# Patient Record
Sex: FEMALE | Race: AMERICAN INDIAN OR ALASKA NATIVE
[De-identification: names, ages, dates, MRNs, and addresses within clinical notes are randomized per-mention and may not be internally consistent; named-entity substitution may affect disease eponyms.]

---

## 2018-03-19 ENCOUNTER — HOSPITAL ENCOUNTER (EMERGENCY)
Dept: HOSPITAL 5 - ED | Age: 63
Discharge: HOME | End: 2018-03-19
Payer: COMMERCIAL

## 2018-03-19 VITALS — SYSTOLIC BLOOD PRESSURE: 112 MMHG | DIASTOLIC BLOOD PRESSURE: 63 MMHG

## 2018-03-19 DIAGNOSIS — E11.65: Primary | ICD-10-CM

## 2018-03-19 LAB
BASOPHILS # (AUTO): 0 K/MM3 (ref 0–0.1)
BASOPHILS NFR BLD AUTO: 0.8 % (ref 0–1.8)
BUN SERPL-MCNC: 9 MG/DL (ref 7–17)
BUN/CREAT SERPL: 13 %
CALCIUM SERPL-MCNC: 9.5 MG/DL (ref 8.4–10.2)
EOSINOPHIL # BLD AUTO: 0 K/MM3 (ref 0–0.4)
EOSINOPHIL NFR BLD AUTO: 0.8 % (ref 0–4.3)
HCT VFR BLD CALC: 41.2 % (ref 30.3–42.9)
HEMOLYSIS INDEX: 6
HGB BLD-MCNC: 13.5 GM/DL (ref 10.1–14.3)
LYMPHOCYTES # BLD AUTO: 2.1 K/MM3 (ref 1.2–5.4)
LYMPHOCYTES NFR BLD AUTO: 35.7 % (ref 13.4–35)
MCH RBC QN AUTO: 30 PG (ref 28–32)
MCHC RBC AUTO-ENTMCNC: 33 % (ref 30–34)
MCV RBC AUTO: 92 FL (ref 79–97)
MONOCYTES # (AUTO): 0.4 K/MM3 (ref 0–0.8)
MONOCYTES % (AUTO): 6 % (ref 0–7.3)
PLATELET # BLD: 361 K/MM3 (ref 140–440)
RBC # BLD AUTO: 4.48 M/MM3 (ref 3.65–5.03)

## 2018-03-19 PROCEDURE — 80048 BASIC METABOLIC PNL TOTAL CA: CPT

## 2018-03-19 PROCEDURE — 82962 GLUCOSE BLOOD TEST: CPT

## 2018-03-19 PROCEDURE — 36415 COLL VENOUS BLD VENIPUNCTURE: CPT

## 2018-03-19 PROCEDURE — 82805 BLOOD GASES W/O2 SATURATION: CPT

## 2018-03-19 PROCEDURE — 99284 EMERGENCY DEPT VISIT MOD MDM: CPT

## 2018-03-19 PROCEDURE — 85025 COMPLETE CBC W/AUTO DIFF WBC: CPT

## 2018-03-19 NOTE — EMERGENCY DEPARTMENT REPORT
ED General Adult HPI





- General


Chief complaint: Hyperglycemia


Stated complaint: HIGH BLOOD GLUCOSE


Time Seen by Provider: 03/19/18 11:01


Source: patient, family, RN notes reviewed


Mode of arrival: Ambulatory


Limitations: No Limitations





- History of Present Illness


Initial comments: 





This is a 62-year-old female.  The patient is previously unknown to this 

provider.  She has a past medical history of diabetes, pacemaker/defibrillator, 

peripheral venous disease.  Patient was at her outpatient vascular surgeon, 

getting an evaluation for an intervention, and she was found incidentally have 

asymptomatic hypotension.  Patient reports that she takes metformin, 1000 mg 

twice daily, Lantus, 24 units at night, and 6 units of Humalog.


She reports compliance with her medications but admits to dietary indiscretions.








She has no complaints.  She denies headache, neck pain, chest pain, abdominal 

pain, shortness of breath, urinary symptoms.





Improves with: none


Worsens with: none


Associated Symptoms: denies other symptoms





- Related Data


 Allergies











Allergy/AdvReac Type Severity Reaction Status Date / Time


 


No Known Allergies Allergy   Verified 03/19/18 11:07














ED Review of Systems


ROS: 


Stated complaint: HIGH BLOOD GLUCOSE


Other details as noted in HPI





Comment: All other systems reviewed and negative





ED Past Medical Hx





- Past Medical History


Hx Diabetes: Yes


Additional medical history: PACEMAKER/DEFIBRILLATOR





- Surgical History


Past Surgical History?: No


Additional Surgical History: STENT PLACED IN LEFT LEG





- Social History


Smoking Status: Never Smoker


Substance Use Type: None





ED Physical Exam





- General


Limitations: No Limitations


General appearance: alert, in no apparent distress





- Head


Head exam: Present: atraumatic, normocephalic





- Eye


Eye exam: Present: normal appearance, PERRL, EOMI.  Absent: nystagmus





- ENT


ENT exam: Present: normal exam, normal orophraynx, mucous membranes moist, 

normal external ear exam





- Neck


Neck exam: Present: normal inspection, full ROM





- Respiratory


Respiratory exam: Present: normal lung sounds bilaterally.  Absent: respiratory 

distress





- Cardiovascular


Cardiovascular Exam: Present: regular rate, normal rhythm, normal heart sounds.

  Absent: systolic murmur, diastolic murmur, rubs, gallop





- GI/Abdominal


GI/Abdominal exam: Present: soft, normal bowel sounds.  Absent: distended, 

tenderness, guarding, rebound, rigid, pulsatile mass





- Extremities Exam


Extremities exam: Present: normal inspection, full ROM, normal capillary 

refill.  Absent: pedal edema, joint swelling, calf tenderness





- Back Exam


Back exam: Present: normal inspection, full ROM.  Absent: tenderness, CVA 

tenderness (R), paraspinal tenderness, vertebral tenderness





- Neurological Exam


Neurological exam: Present: alert, oriented X3, CN II-XII intact, normal gait, 

other (Extraocular movements intact.  Tongue midline.  No facial droop.  Facial 

sensation intact to light touch in the V1, V2, V3 distribution bilaterally.  5 

and 5 strength in 4 extremities..  Sensation is intact to light touch in 4 

extremities.).  Absent: motor sensory deficit





- Psychiatric


Psychiatric exam: Present: normal affect, normal mood





- Skin


Skin exam: Present: warm, dry, intact, normal color.  Absent: rash





ED Course





 Vital Signs











  03/19/18





  10:20


 


Temperature 98.1 F


 


Pulse Rate 94 H


 


Respiratory 18





Rate 


 


Blood Pressure 121/68


 


O2 Sat by Pulse 96





Oximetry 














ED Medical Decision Making





- Lab Data


Result diagrams: 


 03/19/18 10:32





 03/19/18 10:32








 Vital Signs











  03/19/18





  10:20


 


Temperature 98.1 F


 


Pulse Rate 94 H


 


Respiratory 18





Rate 


 


Blood Pressure 121/68


 


O2 Sat by Pulse 96





Oximetry 











 Lab Results











  03/19/18 03/19/18 03/19/18 Range/Units





  10:32 10:32 10:32 


 


WBC  6.0    (4.5-11.0)  K/mm3


 


RBC  4.48    (3.65-5.03)  M/mm3


 


Hgb  13.5    (10.1-14.3)  gm/dl


 


Hct  41.2    (30.3-42.9)  %


 


MCV  92    (79-97)  fl


 


MCH  30    (28-32)  pg


 


MCHC  33    (30-34)  %


 


RDW  13.3    (13.2-15.2)  %


 


Plt Count  361    (140-440)  K/mm3


 


Lymph % (Auto)  35.7 H    (13.4-35.0)  %


 


Mono % (Auto)  6.0    (0.0-7.3)  %


 


Eos % (Auto)  0.8    (0.0-4.3)  %


 


Baso % (Auto)  0.8    (0.0-1.8)  %


 


Lymph #  2.1    (1.2-5.4)  K/mm3


 


Mono #  0.4    (0.0-0.8)  K/mm3


 


Eos #  0.0    (0.0-0.4)  K/mm3


 


Baso #  0.0    (0.0-0.1)  K/mm3


 


Seg Neutrophils %  56.7    (40.0-70.0)  %


 


Seg Neutrophils #  3.4    (1.8-7.7)  K/mm3


 


VBG pH    7.332  (7.320-7.420)  


 


Sodium   132 L   (137-145)  mmol/L


 


Potassium   4.3   (3.6-5.0)  mmol/L


 


Chloride   91.2 L   ()  mmol/L


 


Carbon Dioxide   25   (22-30)  mmol/L


 


Anion Gap   20   mmol/L


 


BUN   9   (7-17)  mg/dL


 


Creatinine   0.7   (0.7-1.2)  mg/dL


 


Estimated GFR   > 60   ml/min


 


BUN/Creatinine Ratio   13   %


 


Glucose   504 H*   ()  mg/dL


 


POC Glucose     ()  


 


Calcium   9.5   (8.4-10.2)  mg/dL














  03/19/18 Range/Units





  10:34 


 


WBC   (4.5-11.0)  K/mm3


 


RBC   (3.65-5.03)  M/mm3


 


Hgb   (10.1-14.3)  gm/dl


 


Hct   (30.3-42.9)  %


 


MCV   (79-97)  fl


 


MCH   (28-32)  pg


 


MCHC   (30-34)  %


 


RDW   (13.2-15.2)  %


 


Plt Count   (140-440)  K/mm3


 


Lymph % (Auto)   (13.4-35.0)  %


 


Mono % (Auto)   (0.0-7.3)  %


 


Eos % (Auto)   (0.0-4.3)  %


 


Baso % (Auto)   (0.0-1.8)  %


 


Lymph #   (1.2-5.4)  K/mm3


 


Mono #   (0.0-0.8)  K/mm3


 


Eos #   (0.0-0.4)  K/mm3


 


Baso #   (0.0-0.1)  K/mm3


 


Seg Neutrophils %   (40.0-70.0)  %


 


Seg Neutrophils #   (1.8-7.7)  K/mm3


 


VBG pH   (7.320-7.420)  


 


Sodium   (137-145)  mmol/L


 


Potassium   (3.6-5.0)  mmol/L


 


Chloride   ()  mmol/L


 


Carbon Dioxide   (22-30)  mmol/L


 


Anion Gap   mmol/L


 


BUN   (7-17)  mg/dL


 


Creatinine   (0.7-1.2)  mg/dL


 


Estimated GFR   ml/min


 


BUN/Creatinine Ratio   %


 


Glucose   ()  mg/dL


 


POC Glucose  438 H  ()  


 


Calcium   (8.4-10.2)  mg/dL














- Medical Decision Making





Differential diagnosis, including but not limited to:


Hyperglycemia, acute on chronic, diabetic ketoacidosis, hyperosmolar state





Assessment and plan: 62-year-old female with asymptomatic hyperglycemia, most 

likely secondary to dietary indiscretion, who has no symptoms.  Her laboratory 

studies are not consistent with anion gap acidosis or hyperosmolar coma.  She 

has no complaints at this time and has an unremarkable physical exam.  

Extensive discussion had with patient and family.  Patient declines any 

intervention at this time, reports she will do a better job at eating better, 

and taking her medications.








This is an acute asymptomatic exacerbation of her chronic medical problem 

without decompensation, given the patient's vital signs, history and physical, 

we both agreed through shared decision-making at the patient does not require 

emergent insulin or fluids at this time and she will monitor her progress at 

home.  She'll be discharged home with her family member who is also in 

accordance with this plan


























Critical care attestation.: 


If time is entered above; I have spent that time in minutes in the direct care 

of this critically ill patient, excluding procedure time.








ED Disposition


Clinical Impression: 


 Hyperglycemia





Disposition: DC-01 TO HOME OR SELFCARE


Is pt being admited?: No


Does the pt Need Aspirin: No


Condition: Stable


Instructions:  Diabetic Hyperglycemia (ED)


Additional Instructions: 


Continue outpatient medications.  Make certain to adhere to a diet as 

recommended by the [employer].  Follow-up with the primary 

care doctor within the next 2-3 weeks.  Return to the ER right away with new 

pain, worsened pain, migration of pain, fevers, chills, lethargy, irritability, 

projectile vomiting, change in mental status, confusion, inability to tolerate 

liquid feeds.


Referrals: 


PRIMARY CARE,MD [Primary Care Provider] - 3-5 Days


DIVINA RAMEY MD [Staff Physician] - 3-5 Days


LISBETH ROMERO MD [Staff Physician] - 3-5 Days


MARILIA ARTEAGA MD [Staff Physician] - 3-5 Days


DICRISTINA,DEMAR, MD [Staff Physician] - 3-5 Days